# Patient Record
Sex: MALE | Race: WHITE | Employment: FULL TIME | URBAN - METROPOLITAN AREA
[De-identification: names, ages, dates, MRNs, and addresses within clinical notes are randomized per-mention and may not be internally consistent; named-entity substitution may affect disease eponyms.]

---

## 2020-09-13 ENCOUNTER — HOSPITAL ENCOUNTER (EMERGENCY)
Facility: CLINIC | Age: 40
Discharge: HOME | End: 2020-09-13
Attending: EMERGENCY MEDICINE | Admitting: EMERGENCY MEDICINE
Payer: COMMERCIAL

## 2020-09-13 ENCOUNTER — APPOINTMENT (OUTPATIENT)
Dept: GENERAL RADIOLOGY | Facility: CLINIC | Age: 40
End: 2020-09-13
Attending: EMERGENCY MEDICINE
Payer: COMMERCIAL

## 2020-09-13 VITALS
RESPIRATION RATE: 18 BRPM | OXYGEN SATURATION: 98 % | HEART RATE: 59 BPM | DIASTOLIC BLOOD PRESSURE: 86 MMHG | TEMPERATURE: 97.6 F | WEIGHT: 157.63 LBS | SYSTOLIC BLOOD PRESSURE: 132 MMHG

## 2020-09-13 DIAGNOSIS — T14.8XXA MUSCLE STRAIN: ICD-10-CM

## 2020-09-13 DIAGNOSIS — M54.6 ACUTE BILATERAL THORACIC BACK PAIN: ICD-10-CM

## 2020-09-13 PROCEDURE — 71046 X-RAY EXAM CHEST 2 VIEWS: CPT

## 2020-09-13 PROCEDURE — 25000132 ZZH RX MED GY IP 250 OP 250 PS 637: Performed by: EMERGENCY MEDICINE

## 2020-09-13 PROCEDURE — 25000131 ZZH RX MED GY IP 250 OP 636 PS 637: Performed by: EMERGENCY MEDICINE

## 2020-09-13 PROCEDURE — 99283 EMERGENCY DEPT VISIT LOW MDM: CPT

## 2020-09-13 RX ORDER — IBUPROFEN 600 MG/1
600 TABLET, FILM COATED ORAL ONCE
Status: COMPLETED | OUTPATIENT
Start: 2020-09-13 | End: 2020-09-13

## 2020-09-13 RX ORDER — LORAZEPAM 1 MG/1
1 TABLET ORAL ONCE
Status: COMPLETED | OUTPATIENT
Start: 2020-09-13 | End: 2020-09-13

## 2020-09-13 RX ORDER — PREDNISONE 20 MG/1
20 TABLET ORAL ONCE
Status: COMPLETED | OUTPATIENT
Start: 2020-09-13 | End: 2020-09-13

## 2020-09-13 RX ORDER — OXYCODONE AND ACETAMINOPHEN 5; 325 MG/1; MG/1
1 TABLET ORAL ONCE
Status: COMPLETED | OUTPATIENT
Start: 2020-09-13 | End: 2020-09-13

## 2020-09-13 RX ORDER — PREDNISONE 20 MG/1
40 TABLET ORAL DAILY
Qty: 10 TABLET | Refills: 0 | Status: SHIPPED | OUTPATIENT
Start: 2020-09-13 | End: 2020-09-18

## 2020-09-13 RX ORDER — METHOCARBAMOL 500 MG/1
1000 TABLET, FILM COATED ORAL 3 TIMES DAILY PRN
Qty: 30 TABLET | Refills: 0 | Status: SHIPPED | OUTPATIENT
Start: 2020-09-13

## 2020-09-13 RX ADMIN — LORAZEPAM 1 MG: 1 TABLET ORAL at 10:52

## 2020-09-13 RX ADMIN — OXYCODONE HYDROCHLORIDE AND ACETAMINOPHEN 1 TABLET: 5; 325 TABLET ORAL at 10:52

## 2020-09-13 RX ADMIN — IBUPROFEN 600 MG: 600 TABLET, FILM COATED ORAL at 10:52

## 2020-09-13 RX ADMIN — PREDNISONE 20 MG: 20 TABLET ORAL at 10:52

## 2020-09-13 ASSESSMENT — ENCOUNTER SYMPTOMS
DIAPHORESIS: 1
COLOR CHANGE: 1
BACK PAIN: 1
WOUND: 1
SHORTNESS OF BREATH: 0

## 2020-09-13 NOTE — ED PROVIDER NOTES
"  History   Chief Complaint:  Back Pain    HPI  Chaparro Dubois is a 40 year old male with a history of previous back injury who presents with mid/upper back pain. He reports he felt fine this morning when he woke up, and got up to stretch. When he stretched his arms up, he suddenly developed mid to upper back \"crampy and spasmy\" pain after taking a deep breath. He reports a wave a diaphoresis at this time, and states he laid down for 30 minutes before crawling to his phone. He states he recognized the pain as similar to his previous back injury. He notes that coughing, movement, and deep breaths exacerbate the pain, and states he has been waking bent over. The patient denies taking anything for pain. He denies lower back pain or ongoing diaphoresis. He denies any recent travel or trauma. He denies shortness of breath or leg pain/swelling.     The patinet is also concerned about tetanus with a recent puncture wound to his right thigh from a nail in a palate 2 days ago. He is unsure of last tetanus, but our records note 2013.    Allergies:  No Known Allergies    Medications:    The patient is currently on no regular medications.    Past Medical History:    Back injury    Past Surgical History:    The patient does not have any pertinent past surgical history.    Family History:    No past pertinent family history.    Social History:  Marital Status: Single  Presents with cousin at bedside  Tobacco use: No  Alcohol use: Unknown  Drug use: Unknown    Review of Systems   Constitutional: Positive for diaphoresis.   Respiratory: Negative for shortness of breath.    Cardiovascular: Negative for leg swelling.   Musculoskeletal: Positive for back pain (Mid/Upper pain, denies lower).   Skin: Positive for color change and wound.   All other systems reviewed and are negative.      Physical Exam     Patient Vitals for the past 24 hrs:   BP Temp Temp src Pulse Resp SpO2 Weight   09/13/20 1100 -- -- -- -- -- 98 % --   09/13/20 1045 " -- -- -- -- -- 99 % --   09/13/20 1030 -- -- -- -- -- 100 % --   09/13/20 1011 132/86 97.6  F (36.4  C) Oral 59 18 99 % 71.5 kg (157 lb 10.1 oz)       Physical Exam  General: The patient is alert, in no respiratory distress.    HENT: Mucous membranes moist.    Cardiovascular: Regular rate and rhythm. Good pulses in all four extremities. Normal capillary refill and skin turgor.     Respiratory: Lungs are clear. No nasal flaring. No retractions. No wheezing, no crackles.    Gastrointestinal: Abdomen soft. No guarding, no rebound. No palpable hernias.     Musculoskeletal: No gross deformity.  Paraspinal tenderness in upper back and pain with sitting upright.    Skin: No rashes. Bruise on right thigh with small puncture.    Neurologic: The patient is alert and oriented x3. GCS 15. No testable cranial nerve deficit. Follows commands with clear and appropriate speech. Gives appropriate answers. Good strength in all extremities. No gross neurologic deficit. Gross sensation intact. Pupils are round and reactive. No meningismus.     Lymphatic: No cervical adenopathy. No lower extremity swelling.    Psychiatric: The patient is non-tearful.    Emergency Department Course     Imaging:  Radiology findings were communicated with the patient who voiced understanding of the findings.    XR Chest PA & LAT:  Heart size is normal. No pleural effusion, pneumothorax,   or abnormal area of consolidation, as per radiology.     Interventions:  1052; Advil, 600 mg, Oral  1052: Ativan, 1 mg, Oral  1052: Deltasone, 20 mg, Oral  1052: Percocet, 1 tab, Oral    Emergency Department Course:  Past medical records, nursing notes, and vitals reviewed.    1025: I performed an exam of the patient as documented above.     The patient was sent for a XR while in the emergency department, results above.     1150: I rechecked the patient and discussed the results of his workup thus far. His pain is better at this time and he is comfortable with  discharge.    Findings and plan explained to the Patient. Patient discharged home with instructions regarding supportive care, medications, and reasons to return. The importance of close follow-up was reviewed. The patient was prescribed Robaxin, Deltasone.    I personally reviewed the imaging results with the Patient and answered all related questions prior to discharge.     Impression & Plan     Medical Decision Making:  The patient presented with fairly sudden onset of first back pain which then generalized to start her upper back.  I was certain about intrathoracic causes such as pneumothorax or even PE however his description does not sound like a event suggestive of PE.  There is no signs of pneumothorax on his chest x-ray discussed that not all thoracic problems are visible on x-ray.  The fact that he is having tenderness over his paraspinal spine indicates this is likely musculoskeletal I did start him on muscle relaxant steroid and scheduled ibuprofen and he will follow-up with his primary care doctor.  Symptoms return were discussed I considered causes such as dissection ACS but felt these were also unlikely as well.      Diagnosis:    ICD-10-CM    1. Acute bilateral thoracic back pain  M54.6    2. Muscle strain  T14.8XXA        Disposition:  Discharged to home.    Discharge Medications:  New Prescriptions    METHOCARBAMOL (ROBAXIN) 500 MG TABLET    Take 2 tablets (1,000 mg) by mouth 3 times daily as needed    PREDNISONE (DELTASONE) 20 MG TABLET    Take 2 tablets (40 mg) by mouth daily for 5 days       Scribe Disclosure:  I, Columba Frederick, am serving as a scribe at 10:32 AM on 9/13/2020 to document services personally performed by Alberto Polanco MD based on my observations and the provider's statements to me.      Alberto Polanco MD  09/13/20 2238

## 2020-09-13 NOTE — ED AVS SNAPSHOT
[facility] Department  [address]  [facility] [address]  Phone:  [phone]  Fax:  [phone]                                    Chaparro Dubois   MRN: [id number]    Department:  [facility] Emergency Department   Date of Visit:  9/13/2020           After Visit Summary Signature Page    I have received my discharge instructions, and my questions have been answered. I have discussed any challenges I see with this plan with the nurse or doctor.    ..........................................................................................................................................  Patient/Patient Representative Signature      ..........................................................................................................................................  Patient Representative Print Name and Relationship to Patient    ..................................................               ................................................  Date                                   Time    ..........................................................................................................................................  Reviewed by Signature/Title    ...................................................              ..............................................  Date                                               Time          22EPIC Rev 08/18

## 2020-09-13 NOTE — ED TRIAGE NOTES
"Patient reports mid-back and upper back pain that started this morning at about 0810 after taking a deep breath. Pain is \"crampy and spasmy\". Reports sweating when it first happened. Denies shortness of breath. He is concerned about tetanus with recent puncture wound right thigh from a nail in a palate. Unsure of last tetanus.   "